# Patient Record
Sex: FEMALE | Race: WHITE
[De-identification: names, ages, dates, MRNs, and addresses within clinical notes are randomized per-mention and may not be internally consistent; named-entity substitution may affect disease eponyms.]

---

## 2019-09-30 ENCOUNTER — HOSPITAL ENCOUNTER (EMERGENCY)
Dept: HOSPITAL 26 - MED | Age: 24
LOS: 1 days | Discharge: HOME | End: 2019-10-01
Payer: COMMERCIAL

## 2019-09-30 VITALS — WEIGHT: 109 LBS | HEIGHT: 60 IN | BODY MASS INDEX: 21.4 KG/M2

## 2019-09-30 VITALS — SYSTOLIC BLOOD PRESSURE: 120 MMHG | DIASTOLIC BLOOD PRESSURE: 70 MMHG

## 2019-09-30 DIAGNOSIS — Z79.899: ICD-10-CM

## 2019-09-30 DIAGNOSIS — M54.41: Primary | ICD-10-CM

## 2019-09-30 PROCEDURE — 99283 EMERGENCY DEPT VISIT LOW MDM: CPT

## 2019-09-30 PROCEDURE — 81002 URINALYSIS NONAUTO W/O SCOPE: CPT

## 2019-09-30 PROCEDURE — 96372 THER/PROPH/DIAG INJ SC/IM: CPT

## 2019-09-30 PROCEDURE — 81025 URINE PREGNANCY TEST: CPT

## 2019-09-30 PROCEDURE — 81003 URINALYSIS AUTO W/O SCOPE: CPT

## 2019-10-01 VITALS — DIASTOLIC BLOOD PRESSURE: 70 MMHG | SYSTOLIC BLOOD PRESSURE: 120 MMHG

## 2019-10-01 LAB
APPEARANCE UR: (no result)
BILIRUB UR QL STRIP: NEGATIVE
COLOR UR: YELLOW
GLUCOSE UR STRIP-MCNC: NEGATIVE MG/DL
HGB UR QL STRIP: NEGATIVE
LEUKOCYTE ESTERASE UR QL STRIP: NEGATIVE
NITRITE UR QL STRIP: NEGATIVE
PH UR STRIP: 6 [PH] (ref 5–9)

## 2020-01-09 ENCOUNTER — HOSPITAL ENCOUNTER (INPATIENT)
Dept: HOSPITAL 26 - MED | Age: 25
LOS: 2 days | Discharge: HOME | DRG: 263 | End: 2020-01-11
Payer: COMMERCIAL

## 2020-01-09 VITALS — HEIGHT: 60 IN | WEIGHT: 110 LBS | BODY MASS INDEX: 21.6 KG/M2

## 2020-01-09 VITALS — DIASTOLIC BLOOD PRESSURE: 72 MMHG | SYSTOLIC BLOOD PRESSURE: 113 MMHG

## 2020-01-09 VITALS — DIASTOLIC BLOOD PRESSURE: 67 MMHG | SYSTOLIC BLOOD PRESSURE: 124 MMHG

## 2020-01-09 DIAGNOSIS — K80.00: Primary | ICD-10-CM

## 2020-01-09 LAB
APPEARANCE UR: (no result)
BILIRUB UR QL STRIP: NEGATIVE
COLOR UR: YELLOW
GLUCOSE UR STRIP-MCNC: NEGATIVE MG/DL
HGB UR QL STRIP: (no result)
HYALINE CASTS URNS QL MICRO: (no result) /LPF
LEUKOCYTE ESTERASE UR QL STRIP: NEGATIVE
NITRITE UR QL STRIP: NEGATIVE
PH UR STRIP: 6 [PH] (ref 5–9)
RBC #/AREA URNS HPF: (no result) /HPF (ref 0–5)
WBC,URINE: (no result) /HPF (ref 0–5)

## 2020-01-09 RX ADMIN — SODIUM CHLORIDE PRN MG: 9 INJECTION, SOLUTION INTRAVENOUS at 23:46

## 2020-01-09 RX ADMIN — HYDROCODONE BITARTRATE AND ACETAMINOPHEN PRN TAB: 5; 325 TABLET ORAL at 21:14

## 2020-01-09 RX ADMIN — DEXTROSE AND SODIUM CHLORIDE SCH MLS/HR: 5; .45 INJECTION, SOLUTION INTRAVENOUS at 22:45

## 2020-01-09 RX ADMIN — METRONIDAZOLE SCH MLS/HR: 500 SOLUTION INTRAVENOUS at 22:30

## 2020-01-09 NOTE — NUR
Patient will be admitted to care of DR MOTTA. Admited to MED SURG .  Will go to 
[id number]. Belongings list completed.  Report to CARMEN ORTEGA.

## 2020-01-09 NOTE — NUR
23 y/o F presents to ER c/o upper right sided back/rib pain x1 month. Pain 
level 9/10, sharp constant pain. Per pt pain is relieved when sitting up, 
worsened when laying down. Pt also c/o urinary burning x 1 week. ERMD made 
aware of pt status.



NKA

med hx: seizures

## 2020-01-10 VITALS — DIASTOLIC BLOOD PRESSURE: 51 MMHG | SYSTOLIC BLOOD PRESSURE: 102 MMHG

## 2020-01-10 VITALS — SYSTOLIC BLOOD PRESSURE: 114 MMHG | DIASTOLIC BLOOD PRESSURE: 75 MMHG

## 2020-01-10 LAB
ALBUMIN FLD-MCNC: 3.2 G/DL (ref 3.4–5)
AMYLASE SERPL-CCNC: 53 U/L (ref 25–115)
ANION GAP SERPL CALCULATED.3IONS-SCNC: 12.2 MMOL/L (ref 8–16)
AST SERPL-CCNC: 11 U/L (ref 15–37)
BASOPHILS # BLD AUTO: 0 K/UL (ref 0–0.22)
BASOPHILS NFR BLD AUTO: 0.4 % (ref 0–2)
BILIRUB SERPL-MCNC: 0.2 MG/DL (ref 0–1)
BUN SERPL-MCNC: 9 MG/DL (ref 7–18)
CHLORIDE SERPL-SCNC: 104 MMOL/L (ref 98–107)
CO2 SERPL-SCNC: 27.7 MMOL/L (ref 21–32)
CREAT SERPL-MCNC: 0.5 MG/DL (ref 0.6–1.3)
EOSINOPHIL # BLD AUTO: 0.2 K/UL (ref 0–0.4)
EOSINOPHIL NFR BLD AUTO: 2.7 % (ref 0–4)
ERYTHROCYTE [DISTWIDTH] IN BLOOD BY AUTOMATED COUNT: 12.9 % (ref 11.6–13.7)
GFR SERPL CREATININE-BSD FRML MDRD: 193 ML/MIN (ref 90–?)
GLUCOSE SERPL-MCNC: 100 MG/DL (ref 74–106)
HCT VFR BLD AUTO: 38.5 % (ref 36–48)
HGB BLD-MCNC: 12.7 G/DL (ref 12–16)
LIPASE SERPL-CCNC: 120 U/L (ref 73–393)
LYMPHOCYTES # BLD AUTO: 4.1 K/UL (ref 2.5–16.5)
LYMPHOCYTES NFR BLD AUTO: 47.3 % (ref 20.5–51.1)
MAGNESIUM SERPL-MCNC: 1.9 MG/DL (ref 1.8–2.4)
MCH RBC QN AUTO: 29 PG (ref 27–31)
MCHC RBC AUTO-ENTMCNC: 33 G/DL (ref 33–37)
MCV RBC AUTO: 88.5 FL (ref 80–94)
MONOCYTES # BLD AUTO: 0.8 K/UL (ref 0.8–1)
MONOCYTES NFR BLD AUTO: 9.4 % (ref 1.7–9.3)
NEUTROPHILS # BLD AUTO: 3.5 K/UL (ref 1.8–7.7)
NEUTROPHILS NFR BLD AUTO: 40.2 % (ref 42.2–75.2)
PLATELET # BLD AUTO: 329 K/UL (ref 140–450)
POTASSIUM SERPL-SCNC: 3.9 MMOL/L (ref 3.5–5.1)
PROTHROMBIN TIME: 9.9 SECS (ref 10.8–13.4)
RBC # BLD AUTO: 4.34 MIL/UL (ref 4.2–5.4)
SODIUM SERPL-SCNC: 140 MMOL/L (ref 136–145)
WBC # BLD AUTO: 8.6 K/UL (ref 4.8–10.8)

## 2020-01-10 PROCEDURE — 0FT44ZZ RESECTION OF GALLBLADDER, PERCUTANEOUS ENDOSCOPIC APPROACH: ICD-10-PCS | Performed by: SURGERY

## 2020-01-10 RX ADMIN — METRONIDAZOLE SCH MLS/HR: 500 SOLUTION INTRAVENOUS at 05:52

## 2020-01-10 RX ADMIN — METRONIDAZOLE SCH MLS/HR: 500 SOLUTION INTRAVENOUS at 21:00

## 2020-01-10 RX ADMIN — METRONIDAZOLE SCH MLS/HR: 500 SOLUTION INTRAVENOUS at 12:38

## 2020-01-10 RX ADMIN — LIDOCAINE HYDROCHLORIDE ONE MG: 10 INJECTION, SOLUTION INFILTRATION; PERINEURAL at 13:08

## 2020-01-10 RX ADMIN — SODIUM CHLORIDE PRN MG: 9 INJECTION, SOLUTION INTRAVENOUS at 23:37

## 2020-01-10 RX ADMIN — ACETAMINOPHEN PRN MG: 325 TABLET ORAL at 10:32

## 2020-01-10 RX ADMIN — BUPIVACAINE HYDROCHLORIDE AND EPINEPHRINE BITARTRATE ONE ML: 2.5; .005 INJECTION, SOLUTION EPIDURAL; INFILTRATION; INTRACAUDAL; PERINEURAL at 15:15

## 2020-01-10 RX ADMIN — SODIUM CHLORIDE PRN MG: 9 INJECTION, SOLUTION INTRAVENOUS at 16:47

## 2020-01-10 RX ADMIN — LIDOCAINE HYDROCHLORIDE ONE MG: 10 INJECTION, SOLUTION INFILTRATION; PERINEURAL at 15:16

## 2020-01-10 RX ADMIN — DEXTROSE AND SODIUM CHLORIDE SCH MLS/HR: 5; .45 INJECTION, SOLUTION INTRAVENOUS at 11:30

## 2020-01-10 RX ADMIN — MORPHINE SULFATE PRN MG: 2 INJECTION, SOLUTION INTRAMUSCULAR; INTRAVENOUS at 22:15

## 2020-01-10 RX ADMIN — MORPHINE SULFATE PRN MG: 2 INJECTION, SOLUTION INTRAMUSCULAR; INTRAVENOUS at 16:47

## 2020-01-10 RX ADMIN — BUPIVACAINE HYDROCHLORIDE AND EPINEPHRINE BITARTRATE ONE ML: 2.5; .005 INJECTION, SOLUTION EPIDURAL; INFILTRATION; INTRACAUDAL; PERINEURAL at 13:09

## 2020-01-10 NOTE — NUR
Note:



Basic Screen: 

Yes

High Risk DC Screen 

No

 Name: 

ROBBIE Lees Tel: 

[phone]

Relationship: 

SISTER

Pre-Admission Living Arrangements: 

Lives with Other

Prior ADL 

 Independent

Current Home Health Name/Tel: 

N/A

Current DME/02 Name/Tel: 

N/A

Current Hospice Name/Tel: 

N/A

Current Dialysis Name/Tel: 

N/A

Healthcare Decision Maker: 

Patient

Advance Directive 

No - REFUSED

Physician Orders for Life Sustaining Treatment Form 

No

Patient/Family Have Educational Needs 

No

Information Taught: 

Community Resources

Person Taught: 

Patient

Teaching Tools: 

Verbal

Factors Affecting Learning: 

None

Participation Level: 

 Active

Evaluation: 

Verbalizes Understanding

Needs Additional Education: 

No

Discipline: 

Case Mgt/Social Svcs

Tentative Discharge Plan/Destination: 

No Needs Identified

Will require assistance post discharge: 

No

 Referred to : 

No

Tentative Discharge Plan Summary: 

Patient is a 24-year-old female admitted for gallstones and abdominal 

pain. Patient has no significant PHMX. Patient was admitted from home. SW 

met with patient to verify demographics with patient. Patient stated she 

lives at home with parents. Patient reports no history of mental health 

and no history of substance abuse. Patient's tentative discharge plan is 

to return home. No further needs identified.

 Signature: 

JUANA Lazar

Date: 

Neri 10, 2020

Time: 

11:37

## 2020-01-10 NOTE — NUR
PT IS BACK FROM CHOLECYSTECTOMY. TOLERATED WELL. VS ARE STABLE: /75, HR 69, TEMP 97.6, 
RR 18, O2 98%, PAIN 4/10. WILL CONTINUE TO MONITOR. 

-------------------------------------------------------------------------------

Addendum: 01/10/20 at 1747 by Rehana Kimball RN

-------------------------------------------------------------------------------

THREE SMALL LAP INCISIONS PRESENT OVER THE UPPER ABDOMEN, ALBERTO.

## 2020-01-10 NOTE — NUR
PATIENT HAS BEEN SCREENED AND CATEGORIZED AS MODERATE NUTRITION RISK. PATIENT WILL BE SEEN 
WITHIN 3-5 DAYS OF ADMISSION.



01/12/20 01/14/20



[address]I [address]

## 2020-01-10 NOTE — NUR
PT IS VISITING WITH FAMILY AT BEDSIDE AND HAVING A SNACK. TOLERATING SOLID FOOD AND LIQUIDS 
WELL. IV MORPHINE ADMINISTERED FOR POST-OP ABD PAIN.

## 2020-01-10 NOTE — NUR
RECEIVED BEDSIDE REPORT FROM NIGHT SHIFT NURSE. PT IS ASLEEP. NO S/S OF DISTRESS OR SOB. PT 
IS ON ROOM AIR. SKIN IS INTACT. IV SITE L AC 20 G, INFUSING D5 1/2 NS 80 ML/HR. PT IS NPO 
FOR POSSIBLE CHOLECYSTECTOMY TODAY. CALL LIGHT IS WITHIN REACH. WILL CONTINUE TO MONITOR.

## 2020-01-10 NOTE — NUR
DC PLANNING:



A 24 YEAR OLD FEMALE FROM HOME, WHO CAME IN DUE TO RIGHT FLANK PAIN ASSOCIATED WITH NAUSEA.  
INITIAL DIAGNOSIS OF GALLSTONES, ABDOMINAL PAIN.  DENIES MEDICAL HISTORY.  CURRENT LABS 
INCLUDE WBC 8.6, H/H 12.7/38.5, BUN/CREA 9/0.5.  CT ABD/PELVIS SHOWED CHOLELITHIASIS WITHOUT 
SECONDARY SIGNS OF CHOLECYSTITIS.  GALLBLADDER U/S CHOLELITHIASIS.  SURGICAL CONSULT WITH 
DR. PABLO FOR GALLSTONES, ABD PAIN.  ON LEVOFLOXACIN.  DC PLAN TO HOME ONCE STABLE.

## 2020-01-10 NOTE — NUR
RECIEVED PT. AAOX4 , NID , IV SITE INTACT AND PATENT , NO ACTIVE BLEEDING NOTED AT SURGICAL 
SITE . HAD GOOD U.O - POST OP. PLAN OF CARE DISCUSSED AND OANH UNDERSTANDING - CALL 
LIGHT WITHIN REACH . WILL CONT. TO MONITOR.

## 2020-01-11 VITALS — SYSTOLIC BLOOD PRESSURE: 110 MMHG | DIASTOLIC BLOOD PRESSURE: 62 MMHG

## 2020-01-11 RX ADMIN — DEXTROSE AND SODIUM CHLORIDE SCH MLS/HR: 5; .45 INJECTION, SOLUTION INTRAVENOUS at 04:56

## 2020-01-11 RX ADMIN — HYDROCODONE BITARTRATE AND ACETAMINOPHEN PRN TAB: 5; 325 TABLET ORAL at 10:03

## 2020-01-11 RX ADMIN — HYDROCODONE BITARTRATE AND ACETAMINOPHEN PRN TAB: 5; 325 TABLET ORAL at 16:26

## 2020-01-11 RX ADMIN — DEXTROSE AND SODIUM CHLORIDE SCH MLS/HR: 5; .45 INJECTION, SOLUTION INTRAVENOUS at 00:00

## 2020-01-11 RX ADMIN — METRONIDAZOLE SCH MLS/HR: 500 SOLUTION INTRAVENOUS at 13:35

## 2020-01-11 RX ADMIN — ACETAMINOPHEN PRN MG: 325 TABLET ORAL at 00:39

## 2020-01-11 RX ADMIN — SODIUM CHLORIDE PRN MG: 9 INJECTION, SOLUTION INTRAVENOUS at 09:50

## 2020-01-11 RX ADMIN — METRONIDAZOLE SCH MLS/HR: 500 SOLUTION INTRAVENOUS at 04:57

## 2020-01-11 NOTE — NUR
RECEIVED REPORT FROM NIGHT SHIFT NURSE JORDAN FOR CONTINUITY OF CARE. PT IN STABLE 
CONDITION. RESPIRATIONS EVEN AND UNLABORED, ROOM AIR. IV INTACT AND PATENT. SAFETY MEASURES 
IN PLACE. BED IN LOW POSITION. CALL LIGHT AT BEDSIDE. WILL CONTINUE TO MONITOR.

## 2020-01-11 NOTE — NUR
GAVE DISCHARGE INSTRUCTIONS AND PRESCRIPTION TO TAKE TO HOME PHARMACY. PT VERBALIZED 
UNDERSTANDING OF INSTRUCTIONS. IV REMOVED, LUMEN INTACT. ID BAND REMOVED. PT WHEELED TO 
LOBBY WHERE FAMILY WAITING WITH VEHICLE. PT IN STABLE CONDITION.

## 2020-01-11 NOTE — NUR
GAVE ORDERED DUE MEDICATIONS AT THIS TIME. PT TOLERATED WELL. WILL CONTINUE TO MONITOR. GAVE 
PRN PAIN MEDICATION PER PT REQUEST. PT TOLERATED WELL.

## 2020-01-11 NOTE — NUR
PT SITTING UP IN BED EATING LUNCH AT THIS TIME. BED IN LOW POSITION. CALL LIGHT AT BEDSIDE. 
WILL CONTINUE TO MONITOR.

## 2020-01-15 NOTE — NUR
PCP Appointment:



HARVEY contacted Sam at Dr. Cynthia Zafar's office to schedule hospital follow up appointment 
[phone]. Appointment is at 1400 on 01/17/2020 @ [facility]rell Kennedy. [facility] [place] [address]. Patient was notified. No further needs identified.

## 2021-10-23 NOTE — NUR
RECIEVED PT FROM ER /WHEELCHAIR , AMBULATES TO BED , AAOX4 , NID IV SITE INTACT AND PATENT , 
NPO - RE INSTRUCTED . ADM. ASSESSMENT DONE . POC INSTRUCTED AND VERBALIZED UNDERSTANDING - 
CALL LIGHT WITHIN REACH . WITH C/O ABDL PAIN - WILL MEDICATE , WILL CONT. TO MONITOR. Patient is currently domiciled with his parents in a private residence

## 2023-08-09 NOTE — NUR
Dose adjustment  Sandra Plascencia, RN, BSN  [facility]     ENDORSED TO AM SHIFT  FOR CONT. OF CARE . NPO